# Patient Record
Sex: FEMALE | URBAN - METROPOLITAN AREA
[De-identification: names, ages, dates, MRNs, and addresses within clinical notes are randomized per-mention and may not be internally consistent; named-entity substitution may affect disease eponyms.]

---

## 2018-07-27 ENCOUNTER — INPATIENT (INPATIENT)
Facility: HOSPITAL | Age: 73
LOS: 2 days | Discharge: NURSING, REHAB OR LONG-TERM CARE | DRG: 884 | End: 2018-07-30
Attending: FAMILY MEDICINE | Admitting: FAMILY MEDICINE
Payer: MEDICARE

## 2018-07-27 VITALS
TEMPERATURE: 98 F | SYSTOLIC BLOOD PRESSURE: 138 MMHG | HEIGHT: 66 IN | RESPIRATION RATE: 16 BRPM | OXYGEN SATURATION: 97 % | DIASTOLIC BLOOD PRESSURE: 82 MMHG | HEART RATE: 88 BPM | WEIGHT: 119.93 LBS

## 2018-07-27 DIAGNOSIS — Z65.9 PROBLEM RELATED TO UNSPECIFIED PSYCHOSOCIAL CIRCUMSTANCES: ICD-10-CM

## 2018-07-27 LAB
ALBUMIN SERPL ELPH-MCNC: 3.4 G/DL — SIGNIFICANT CHANGE UP (ref 3.3–5)
ALP SERPL-CCNC: 80 U/L — SIGNIFICANT CHANGE UP (ref 30–120)
ALT FLD-CCNC: 30 U/L DA — SIGNIFICANT CHANGE UP (ref 10–60)
ANION GAP SERPL CALC-SCNC: 6 MMOL/L — SIGNIFICANT CHANGE UP (ref 5–17)
APPEARANCE UR: ABNORMAL
AST SERPL-CCNC: 31 U/L — SIGNIFICANT CHANGE UP (ref 10–40)
BASOPHILS # BLD AUTO: 0.03 K/UL — SIGNIFICANT CHANGE UP (ref 0–0.2)
BASOPHILS NFR BLD AUTO: 0.5 % — SIGNIFICANT CHANGE UP (ref 0–2)
BILIRUB SERPL-MCNC: 0.4 MG/DL — SIGNIFICANT CHANGE UP (ref 0.2–1.2)
BILIRUB UR-MCNC: NEGATIVE — SIGNIFICANT CHANGE UP
BUN SERPL-MCNC: 11 MG/DL — SIGNIFICANT CHANGE UP (ref 7–23)
CALCIUM SERPL-MCNC: 9.6 MG/DL — SIGNIFICANT CHANGE UP (ref 8.4–10.5)
CHLORIDE SERPL-SCNC: 105 MMOL/L — SIGNIFICANT CHANGE UP (ref 96–108)
CO2 SERPL-SCNC: 27 MMOL/L — SIGNIFICANT CHANGE UP (ref 22–31)
COLOR SPEC: YELLOW — SIGNIFICANT CHANGE UP
CREAT SERPL-MCNC: 0.77 MG/DL — SIGNIFICANT CHANGE UP (ref 0.5–1.3)
DIFF PNL FLD: ABNORMAL
EOSINOPHIL # BLD AUTO: 0.07 K/UL — SIGNIFICANT CHANGE UP (ref 0–0.5)
EOSINOPHIL NFR BLD AUTO: 1.2 % — SIGNIFICANT CHANGE UP (ref 0–6)
GLUCOSE SERPL-MCNC: 116 MG/DL — HIGH (ref 70–99)
GLUCOSE UR QL: NEGATIVE MG/DL — SIGNIFICANT CHANGE UP
HCT VFR BLD CALC: 41.6 % — SIGNIFICANT CHANGE UP (ref 34.5–45)
HGB BLD-MCNC: 13.3 G/DL — SIGNIFICANT CHANGE UP (ref 11.5–15.5)
IMM GRANULOCYTES NFR BLD AUTO: 0.2 % — SIGNIFICANT CHANGE UP (ref 0–1.5)
KETONES UR-MCNC: NEGATIVE — SIGNIFICANT CHANGE UP
LEUKOCYTE ESTERASE UR-ACNC: ABNORMAL
LYMPHOCYTES # BLD AUTO: 1.66 K/UL — SIGNIFICANT CHANGE UP (ref 1–3.3)
LYMPHOCYTES # BLD AUTO: 28.3 % — SIGNIFICANT CHANGE UP (ref 13–44)
MCHC RBC-ENTMCNC: 26.8 PG — LOW (ref 27–34)
MCHC RBC-ENTMCNC: 32 GM/DL — SIGNIFICANT CHANGE UP (ref 32–36)
MCV RBC AUTO: 83.9 FL — SIGNIFICANT CHANGE UP (ref 80–100)
MONOCYTES # BLD AUTO: 0.44 K/UL — SIGNIFICANT CHANGE UP (ref 0–0.9)
MONOCYTES NFR BLD AUTO: 7.5 % — SIGNIFICANT CHANGE UP (ref 2–14)
NEUTROPHILS # BLD AUTO: 3.66 K/UL — SIGNIFICANT CHANGE UP (ref 1.8–7.4)
NEUTROPHILS NFR BLD AUTO: 62.3 % — SIGNIFICANT CHANGE UP (ref 43–77)
NITRITE UR-MCNC: POSITIVE
PH UR: 8 — SIGNIFICANT CHANGE UP (ref 5–8)
PLATELET # BLD AUTO: 283 K/UL — SIGNIFICANT CHANGE UP (ref 150–400)
POTASSIUM SERPL-MCNC: 4.5 MMOL/L — SIGNIFICANT CHANGE UP (ref 3.5–5.3)
POTASSIUM SERPL-SCNC: 4.5 MMOL/L — SIGNIFICANT CHANGE UP (ref 3.5–5.3)
PROT SERPL-MCNC: 8.2 G/DL — SIGNIFICANT CHANGE UP (ref 6–8.3)
PROT UR-MCNC: NEGATIVE MG/DL — SIGNIFICANT CHANGE UP
RBC # BLD: 4.96 M/UL — SIGNIFICANT CHANGE UP (ref 3.8–5.2)
RBC # FLD: 13.6 % — SIGNIFICANT CHANGE UP (ref 10.3–14.5)
SODIUM SERPL-SCNC: 138 MMOL/L — SIGNIFICANT CHANGE UP (ref 135–145)
SP GR SPEC: 1.01 — SIGNIFICANT CHANGE UP (ref 1.01–1.02)
UROBILINOGEN FLD QL: NEGATIVE MG/DL — SIGNIFICANT CHANGE UP
WBC # BLD: 5.87 K/UL — SIGNIFICANT CHANGE UP (ref 3.8–10.5)
WBC # FLD AUTO: 5.87 K/UL — SIGNIFICANT CHANGE UP (ref 3.8–10.5)

## 2018-07-27 PROCEDURE — 93010 ELECTROCARDIOGRAM REPORT: CPT | Mod: 76

## 2018-07-27 PROCEDURE — 99222 1ST HOSP IP/OBS MODERATE 55: CPT | Mod: AI

## 2018-07-27 PROCEDURE — 99285 EMERGENCY DEPT VISIT HI MDM: CPT

## 2018-07-27 PROCEDURE — 71046 X-RAY EXAM CHEST 2 VIEWS: CPT | Mod: 26

## 2018-07-27 RX ORDER — DOCUSATE SODIUM 100 MG
100 CAPSULE ORAL THREE TIMES A DAY
Refills: 0 | Status: DISCONTINUED | OUTPATIENT
Start: 2018-07-27 | End: 2018-07-30

## 2018-07-27 RX ORDER — MEMANTINE HYDROCHLORIDE 10 MG/1
10 TABLET ORAL DAILY
Refills: 0 | Status: DISCONTINUED | OUTPATIENT
Start: 2018-07-27 | End: 2018-07-30

## 2018-07-27 RX ORDER — ONDANSETRON 8 MG/1
4 TABLET, FILM COATED ORAL EVERY 6 HOURS
Refills: 0 | Status: DISCONTINUED | OUTPATIENT
Start: 2018-07-27 | End: 2018-07-30

## 2018-07-27 RX ORDER — HEPARIN SODIUM 5000 [USP'U]/ML
5000 INJECTION INTRAVENOUS; SUBCUTANEOUS EVERY 12 HOURS
Refills: 0 | Status: DISCONTINUED | OUTPATIENT
Start: 2018-07-27 | End: 2018-07-30

## 2018-07-27 RX ORDER — METOPROLOL TARTRATE 50 MG
25 TABLET ORAL DAILY
Refills: 0 | Status: DISCONTINUED | OUTPATIENT
Start: 2018-07-27 | End: 2018-07-30

## 2018-07-27 RX ORDER — CEFTRIAXONE 500 MG/1
1 INJECTION, POWDER, FOR SOLUTION INTRAMUSCULAR; INTRAVENOUS ONCE
Refills: 0 | Status: COMPLETED | OUTPATIENT
Start: 2018-07-27 | End: 2018-07-27

## 2018-07-27 RX ORDER — CEFTRIAXONE 500 MG/1
1 INJECTION, POWDER, FOR SOLUTION INTRAMUSCULAR; INTRAVENOUS EVERY 24 HOURS
Refills: 0 | Status: DISCONTINUED | OUTPATIENT
Start: 2018-07-28 | End: 2018-07-30

## 2018-07-27 RX ORDER — ACETAMINOPHEN 500 MG
650 TABLET ORAL EVERY 6 HOURS
Refills: 0 | Status: DISCONTINUED | OUTPATIENT
Start: 2018-07-27 | End: 2018-07-30

## 2018-07-27 RX ORDER — SENNA PLUS 8.6 MG/1
2 TABLET ORAL AT BEDTIME
Refills: 0 | Status: DISCONTINUED | OUTPATIENT
Start: 2018-07-27 | End: 2018-07-30

## 2018-07-27 RX ADMIN — HEPARIN SODIUM 5000 UNIT(S): 5000 INJECTION INTRAVENOUS; SUBCUTANEOUS at 21:05

## 2018-07-27 RX ADMIN — CEFTRIAXONE 100 GRAM(S): 500 INJECTION, POWDER, FOR SOLUTION INTRAMUSCULAR; INTRAVENOUS at 15:51

## 2018-07-27 RX ADMIN — Medication 100 MILLIGRAM(S): at 21:05

## 2018-07-27 NOTE — H&P ADULT - GASTROINTESTINAL DETAILS
normal/soft/nontender/no distention/bowel sounds normal/no rebound tenderness/no guarding/no rigidity

## 2018-07-27 NOTE — ED PROVIDER NOTE - PROGRESS NOTE DETAILS
spoke with , Virgie who will see patient in ED patient resting comfortably. eating lunch. no complaints. Virgie  has been down to see patient. will call hospitalist for admission Tracy Joyner for Dr Shivam Loo:Pt here with niece who is unable to care for pt. Brought to ED today for placement. Pt is well appearing, ROS is otherwise negative. Spoke with hospitalist, Dr. Barton. Will speak with  for disposition options Dr. Barton will admit. patient and family informed of plan spoke with Dr. Barton regarding urine results. would like rocephin ordered

## 2018-07-27 NOTE — ED ADULT NURSE NOTE - OBJECTIVE STATEMENT
patient is from home, hx of dementia and need social placement, patient's care give passed away and unable to stay home by herself. patient is confused but calm at this time.

## 2018-07-27 NOTE — ED PROVIDER NOTE - OBJECTIVE STATEMENT
presents with adopted niece (sister's adopted daughter) for medical evaluation and placement. history of demential and Alzheimer's. patient's sister was caregiver who passed away in January. Patient's sister daughter was living with patient in her house taking care of her and "just randomly left" yesterday stating she couldn't handle it anymore. no one able to reach her. Neighbor found patient randomly walking around yesterday and called patient's adopted niece who came and got her. Patient's adopted niece unable to care for patient long tern due to having full time job, having 2 kids, and getting  in 3 weeks. patient at normal neuro baseline. no pain or complaints. patient unable to care for self. not oriented to time, person, or place. presents with adopted niece (sister's adopted daughter) for medical evaluation and placement. history of demential and Alzheimer's. patient's sister was caregiver who passed away in January. Patient's sister daughter was living with patient in her house taking care of her and "just randomly left" yesterday stating she couldn't handle it anymore. no one able to reach her. Neighbor found patient randomly walking around yesterday and called patient's adopted niece who came and got her. Patient's adopted niece unable to care for patient long tern due to having full time job, having 2 kids, and getting  in 3 weeks. patient at normal neuro baseline. no pain or complaints. patient unable to care for self. not oriented to time, person, or place.    PCP Dr. Guzman

## 2018-07-27 NOTE — H&P ADULT - RS GEN PE MLT RESP DETAILS PC
normal/airway patent/breath sounds equal/good air movement/respirations non-labored/clear to auscultation bilaterally/no chest wall tenderness

## 2018-07-27 NOTE — H&P ADULT - NEUROLOGICAL DETAILS
responds to pain/responds to verbal commands/sensation intact/deep reflexes intact/cranial nerves intact/superficial reflexes intact/normal strength

## 2018-07-27 NOTE — H&P ADULT - ATTENDING COMMENTS
IMPROVE VTE Individual Risk Assessment    RISK                                                          Points  [] Previous VTE                                           3  [] Thrombophilia                                        2  [] Lower limb paralysis                              2   [] Current Cancer                                       2   [] Immobilization > 24 hrs                        1  [] ICU/CCU stay > 24 hours                       1  [] Age > 60                                                   1    IMPROVE VTE Score:2  heparin for DVT

## 2018-07-27 NOTE — H&P ADULT - ASSESSMENT
Patient is 72 yo female with hx of dementia, and HTN presenting with     1.  Dementia.  Need social admission, and placement.  PT  and  consult  2.  UTI.  Continue with rocephin pending Urine culture  3.  HTN.  Continue with metoprolol, monitor BP    called PMD at [phone] to obtain further information, await a call bacl ( Dr. Womack)

## 2018-07-27 NOTE — ED PROVIDER NOTE - SECONDARY DIAGNOSIS.
Dementia without behavioral disturbance, unspecified dementia type Urinary tract infection without hematuria, site unspecified

## 2018-07-27 NOTE — ED PROVIDER NOTE - CARE PLAN
Principal Discharge DX:	Poor social situation  Secondary Diagnosis:	Dementia without behavioral disturbance, unspecified dementia type Principal Discharge DX:	Poor social situation  Secondary Diagnosis:	Dementia without behavioral disturbance, unspecified dementia type  Secondary Diagnosis:	Urinary tract infection without hematuria, site unspecified

## 2018-07-27 NOTE — ED PROVIDER NOTE - MEDICAL DECISION MAKING DETAILS
presents for social placement. history of dementia. unable to care for self. recent caregiver  in  and other caregiver left yesterday without notifying anyone. at normal neuro baseline per adopted niece. will order CBC, CMP. UA, CXR, EKG, and contact  for consult

## 2018-07-27 NOTE — ED PROVIDER NOTE - NEUROLOGICAL, MLM
alert. able to follow simple commands. not oriented to time, person, or place (normal neuro baseline per patient's adopted niece)

## 2018-07-27 NOTE — H&P ADULT - HISTORY OF PRESENT ILLNESS
Patient is 72 yo female with hx of dementia presenting to the hospital for social admission, and placement.  patient is current living with her sister adopted daughter which is unable to provide for her.  patient is confused at baseline, unable to obtain ROS, or History.

## 2018-07-28 LAB
ANION GAP SERPL CALC-SCNC: 7 MMOL/L — SIGNIFICANT CHANGE UP (ref 5–17)
BUN SERPL-MCNC: 8 MG/DL — SIGNIFICANT CHANGE UP (ref 7–23)
CALCIUM SERPL-MCNC: 9.4 MG/DL — SIGNIFICANT CHANGE UP (ref 8.4–10.5)
CHLORIDE SERPL-SCNC: 105 MMOL/L — SIGNIFICANT CHANGE UP (ref 96–108)
CO2 SERPL-SCNC: 31 MMOL/L — SIGNIFICANT CHANGE UP (ref 22–31)
CREAT SERPL-MCNC: 0.8 MG/DL — SIGNIFICANT CHANGE UP (ref 0.5–1.3)
GLUCOSE SERPL-MCNC: 99 MG/DL — SIGNIFICANT CHANGE UP (ref 70–99)
HCT VFR BLD CALC: 36.2 % — SIGNIFICANT CHANGE UP (ref 34.5–45)
HGB BLD-MCNC: 11.8 G/DL — SIGNIFICANT CHANGE UP (ref 11.5–15.5)
MCHC RBC-ENTMCNC: 27.2 PG — SIGNIFICANT CHANGE UP (ref 27–34)
MCHC RBC-ENTMCNC: 32.6 GM/DL — SIGNIFICANT CHANGE UP (ref 32–36)
MCV RBC AUTO: 83.4 FL — SIGNIFICANT CHANGE UP (ref 80–100)
NRBC # BLD: 0 /100 WBCS — SIGNIFICANT CHANGE UP (ref 0–0)
PLATELET # BLD AUTO: 295 K/UL — SIGNIFICANT CHANGE UP (ref 150–400)
POTASSIUM SERPL-MCNC: 3.7 MMOL/L — SIGNIFICANT CHANGE UP (ref 3.5–5.3)
POTASSIUM SERPL-SCNC: 3.7 MMOL/L — SIGNIFICANT CHANGE UP (ref 3.5–5.3)
RBC # BLD: 4.34 M/UL — SIGNIFICANT CHANGE UP (ref 3.8–5.2)
RBC # FLD: 13.4 % — SIGNIFICANT CHANGE UP (ref 10.3–14.5)
SODIUM SERPL-SCNC: 143 MMOL/L — SIGNIFICANT CHANGE UP (ref 135–145)
WBC # BLD: 4.95 K/UL — SIGNIFICANT CHANGE UP (ref 3.8–10.5)
WBC # FLD AUTO: 4.95 K/UL — SIGNIFICANT CHANGE UP (ref 3.8–10.5)

## 2018-07-28 PROCEDURE — 99232 SBSQ HOSP IP/OBS MODERATE 35: CPT

## 2018-07-28 RX ADMIN — HEPARIN SODIUM 5000 UNIT(S): 5000 INJECTION INTRAVENOUS; SUBCUTANEOUS at 10:51

## 2018-07-28 RX ADMIN — MEMANTINE HYDROCHLORIDE 10 MILLIGRAM(S): 10 TABLET ORAL at 13:40

## 2018-07-28 RX ADMIN — CEFTRIAXONE 100 GRAM(S): 500 INJECTION, POWDER, FOR SOLUTION INTRAMUSCULAR; INTRAVENOUS at 17:36

## 2018-07-28 RX ADMIN — Medication 100 MILLIGRAM(S): at 13:43

## 2018-07-28 RX ADMIN — Medication 100 MILLIGRAM(S): at 22:06

## 2018-07-28 RX ADMIN — HEPARIN SODIUM 5000 UNIT(S): 5000 INJECTION INTRAVENOUS; SUBCUTANEOUS at 22:06

## 2018-07-28 RX ADMIN — Medication 25 MILLIGRAM(S): at 06:26

## 2018-07-28 RX ADMIN — Medication 100 MILLIGRAM(S): at 06:26

## 2018-07-28 NOTE — PHYSICAL THERAPY INITIAL EVALUATION ADULT - ADDITIONAL COMMENTS
Pt with dementia, stated she didn't ambulate with device and required assistance with showering and dressing however questionable historian. Pt not oriented to time, place or self (unable to state last name).

## 2018-07-29 RX ADMIN — CEFTRIAXONE 100 GRAM(S): 500 INJECTION, POWDER, FOR SOLUTION INTRAMUSCULAR; INTRAVENOUS at 17:11

## 2018-07-29 RX ADMIN — Medication 100 MILLIGRAM(S): at 22:03

## 2018-07-29 RX ADMIN — MEMANTINE HYDROCHLORIDE 10 MILLIGRAM(S): 10 TABLET ORAL at 11:14

## 2018-07-29 RX ADMIN — HEPARIN SODIUM 5000 UNIT(S): 5000 INJECTION INTRAVENOUS; SUBCUTANEOUS at 22:02

## 2018-07-29 RX ADMIN — Medication 100 MILLIGRAM(S): at 05:28

## 2018-07-29 RX ADMIN — Medication 100 MILLIGRAM(S): at 13:42

## 2018-07-29 RX ADMIN — Medication 25 MILLIGRAM(S): at 05:28

## 2018-07-29 RX ADMIN — HEPARIN SODIUM 5000 UNIT(S): 5000 INJECTION INTRAVENOUS; SUBCUTANEOUS at 11:14

## 2018-07-30 ENCOUNTER — TRANSCRIPTION ENCOUNTER (OUTPATIENT)
Age: 73
End: 2018-07-30

## 2018-07-30 VITALS
TEMPERATURE: 98 F | HEART RATE: 81 BPM | DIASTOLIC BLOOD PRESSURE: 73 MMHG | RESPIRATION RATE: 16 BRPM | OXYGEN SATURATION: 96 % | SYSTOLIC BLOOD PRESSURE: 112 MMHG

## 2018-07-30 LAB
ANION GAP SERPL CALC-SCNC: 10 MMOL/L — SIGNIFICANT CHANGE UP (ref 5–17)
BUN SERPL-MCNC: 11 MG/DL — SIGNIFICANT CHANGE UP (ref 7–23)
CALCIUM SERPL-MCNC: 9.3 MG/DL — SIGNIFICANT CHANGE UP (ref 8.4–10.5)
CHLORIDE SERPL-SCNC: 104 MMOL/L — SIGNIFICANT CHANGE UP (ref 96–108)
CO2 SERPL-SCNC: 29 MMOL/L — SIGNIFICANT CHANGE UP (ref 22–31)
CREAT SERPL-MCNC: 0.85 MG/DL — SIGNIFICANT CHANGE UP (ref 0.5–1.3)
GLUCOSE SERPL-MCNC: 99 MG/DL — SIGNIFICANT CHANGE UP (ref 70–99)
HCT VFR BLD CALC: 38.2 % — SIGNIFICANT CHANGE UP (ref 34.5–45)
HGB BLD-MCNC: 12.2 G/DL — SIGNIFICANT CHANGE UP (ref 11.5–15.5)
MCHC RBC-ENTMCNC: 26.7 PG — LOW (ref 27–34)
MCHC RBC-ENTMCNC: 31.9 GM/DL — LOW (ref 32–36)
MCV RBC AUTO: 83.6 FL — SIGNIFICANT CHANGE UP (ref 80–100)
NRBC # BLD: 0 /100 WBCS — SIGNIFICANT CHANGE UP (ref 0–0)
PLATELET # BLD AUTO: 288 K/UL — SIGNIFICANT CHANGE UP (ref 150–400)
POTASSIUM SERPL-MCNC: 3.9 MMOL/L — SIGNIFICANT CHANGE UP (ref 3.5–5.3)
POTASSIUM SERPL-SCNC: 3.9 MMOL/L — SIGNIFICANT CHANGE UP (ref 3.5–5.3)
RBC # BLD: 4.57 M/UL — SIGNIFICANT CHANGE UP (ref 3.8–5.2)
RBC # FLD: 13.6 % — SIGNIFICANT CHANGE UP (ref 10.3–14.5)
SODIUM SERPL-SCNC: 143 MMOL/L — SIGNIFICANT CHANGE UP (ref 135–145)
WBC # BLD: 5.81 K/UL — SIGNIFICANT CHANGE UP (ref 3.8–10.5)
WBC # FLD AUTO: 5.81 K/UL — SIGNIFICANT CHANGE UP (ref 3.8–10.5)

## 2018-07-30 PROCEDURE — 93005 ELECTROCARDIOGRAM TRACING: CPT

## 2018-07-30 PROCEDURE — 99285 EMERGENCY DEPT VISIT HI MDM: CPT | Mod: 25

## 2018-07-30 PROCEDURE — 80048 BASIC METABOLIC PNL TOTAL CA: CPT

## 2018-07-30 PROCEDURE — 97161 PT EVAL LOW COMPLEX 20 MIN: CPT

## 2018-07-30 PROCEDURE — 97116 GAIT TRAINING THERAPY: CPT

## 2018-07-30 PROCEDURE — 85027 COMPLETE CBC AUTOMATED: CPT

## 2018-07-30 PROCEDURE — 99239 HOSP IP/OBS DSCHRG MGMT >30: CPT

## 2018-07-30 PROCEDURE — 71046 X-RAY EXAM CHEST 2 VIEWS: CPT

## 2018-07-30 PROCEDURE — 80053 COMPREHEN METABOLIC PANEL: CPT

## 2018-07-30 PROCEDURE — 97110 THERAPEUTIC EXERCISES: CPT

## 2018-07-30 PROCEDURE — 36415 COLL VENOUS BLD VENIPUNCTURE: CPT

## 2018-07-30 PROCEDURE — 81001 URINALYSIS AUTO W/SCOPE: CPT

## 2018-07-30 RX ORDER — CEFUROXIME AXETIL 250 MG
1 TABLET ORAL
Qty: 10 | Refills: 0
Start: 2018-07-30 | End: 2018-08-03

## 2018-07-30 RX ADMIN — Medication 100 MILLIGRAM(S): at 13:38

## 2018-07-30 RX ADMIN — CEFTRIAXONE 100 GRAM(S): 500 INJECTION, POWDER, FOR SOLUTION INTRAMUSCULAR; INTRAVENOUS at 15:58

## 2018-07-30 RX ADMIN — MEMANTINE HYDROCHLORIDE 10 MILLIGRAM(S): 10 TABLET ORAL at 11:43

## 2018-07-30 RX ADMIN — Medication 100 MILLIGRAM(S): at 06:11

## 2018-07-30 RX ADMIN — HEPARIN SODIUM 5000 UNIT(S): 5000 INJECTION INTRAVENOUS; SUBCUTANEOUS at 11:43

## 2018-07-30 NOTE — DISCHARGE NOTE ADULT - SECONDARY DIAGNOSIS.
HTN (hypertension) Urinary tract infection without hematuria, site unspecified Dementia without behavioral disturbance, unspecified dementia type

## 2018-07-30 NOTE — DISCHARGE NOTE ADULT - HOSPITAL COURSE
Patient is 72 yo female with hx of dementia, and HTN presenting with     1.  Dementia.  admitted for social admission placement.  PT  and  consult  d/c planning  contniue on namenda   2.  UTI.  Continue with  ceftin   3.  HTN.  controlled.   Continue with metoprolol,   monitor BP closely.   4. Dementia - continue with memantine .     d/c planning

## 2018-07-30 NOTE — DISCHARGE NOTE ADULT - PATIENT PORTAL LINK FT
You can access the [email or web address], offered by [facility], by registering with the following website: [email or web address]

## 2018-07-30 NOTE — DISCHARGE NOTE ADULT - CARE PLAN
Principal Discharge DX:	Poor social situation  Goal:	placement  Assessment and plan of treatment:	d/c planning  Secondary Diagnosis:	Dementia without behavioral disturbance, unspecified dementia type  Goal:	namedine  Secondary Diagnosis:	HTN (hypertension)  Secondary Diagnosis:	Urinary tract infection without hematuria, site unspecified  Goal:	complete ceftin for 5 days

## 2018-07-30 NOTE — DISCHARGE NOTE ADULT - MEDICATION SUMMARY - MEDICATIONS TO TAKE
I will START or STAY ON the medications listed below when I get home from the hospital:    Toprol-XL 25 mg oral tablet, extended release  -- 1 tab(s) by mouth once a day  -- Indication: For HTN (hypertension)    cefuroxime 250 mg oral tablet  -- 1 tab(s) by mouth every 12 hours   -- Finish all this medication unless otherwise directed by prescriber.  Medication should be taken with plenty of water.  Take with food or milk.    -- Indication: For Uti    memantine 10 mg oral tablet  -- 10 milligram(s) by mouth once a day  -- Indication: For Dementia

## 2018-07-30 NOTE — PROGRESS NOTE ADULT - ASSESSMENT
Patient is 72 yo female with hx of dementia, and HTN presenting with     1.  Dementia.  Need social admission, and placement.  PT  and  consult  2.  UTI.  Continue with rocephin pending Urine culture  3.  HTN.  Continue with metoprolol, monitor BP    called PMD at [phone] to obtain further information, await a call bacl ( Dr. Womack)
Patient is 74 yo female with hx of dementia, and HTN presenting with     1.  Dementia.  Need social admission, and placement.  PT  and  consult  2.  UTI.  Continue with  ceftriaxone   pending Urine culture  3.  HTN.  controlled.   Continue with metoprolol,   monitor BP closely.   4. Dementia - continue with memantine .
Patient is 74 yo female with hx of dementia, and HTN presenting with     1.  Dementia.  Need social admission, and placement.  PT  and  consult  2.  UTI.  Continue with  ceftriaxone   pending Urine culture  3.  HTN.  controlled.   Continue with metoprolol,   monitor BP closely.   4. Dementia - continue with memantine .

## 2018-07-30 NOTE — PROGRESS NOTE ADULT - SUBJECTIVE AND OBJECTIVE BOX
Patient is a 73y old  Female who presents with a chief complaint of Social admission (2018 16:02)        HPI:  Patient is 74 yo female with hx of dementia presenting to the hospital for social admission, and placement.  patient is current living with her sister adopted daughter which is unable to provide for her.  patient is confused at baseline, unable to obtain ROS, or History. (2018 16:02)      SUBJECTIVE & OBJECTIVE: Pt seen and examined at bedside, comfortable, pleasently confused     PHYSICAL EXAM:  T(C): 36.7 (18 @ 09:33), Max: 36.9 (18 @ 00:00)  HR: 69 (18 @ 09:33) (69 - 93)  BP: 133/84 (18 @ 09:33) (128/77 - 156/87)  RR: 16 (18 @ 09:33) (15 - 16)  SpO2: 97% (18 @ 09:33) (97% - 100%)  Wt(kg): -- Height (cm): 167.64 ( @ 11:00)  Weight (kg): 54.4 ( @ 11:00)  BMI (kg/m2): 19.4 ( @ 11:00)  BSA (m2): 1.61 ( @ 11:00)  GENERAL: pleasently confused   HEAD:  Atraumatic, Normocephalic  EYES: EOMI, PERRLA, conjunctiva and sclera clear  ENMT: Moist mucous membranes  NECK: Supple, No JVD  NERVOUS SYSTEM:  Alert & Oriented X1  CHEST/LUNG: decrease air entry at the bases   HEART: Regular rate and rhythm; No murmurs, rubs, or gallops  ABDOMEN: Soft, Nontender, Nondistended; Bowel sounds present  EXTREMITIES:  2+ Peripheral Pulses, No clubbing, cyanosis, or edema        MEDICATIONS  (STANDING):  cefTRIAXone   IVPB 1 Gram(s) IV Intermittent every 24 hours  docusate sodium 100 milliGRAM(s) Oral three times a day  heparin  Injectable 5000 Unit(s) SubCutaneous every 12 hours  memantine 10 milliGRAM(s) Oral daily  metoprolol succinate ER 25 milliGRAM(s) Oral daily    MEDICATIONS  (PRN):  acetaminophen   Tablet. 650 milliGRAM(s) Oral every 6 hours PRN Mild Pain (1 - 3)  ondansetron Injectable 4 milliGRAM(s) IV Push every 6 hours PRN Nausea  senna 2 Tablet(s) Oral at bedtime PRN Constipation      LABS:                        11.8   4.95  )-----------( 295      ( 2018 08:07 )             36.2     07-    143  |  105  |  8   ----------------------------<  99  3.7   |  31  |  0.80    Ca    9.4      2018 08:07    TPro  8.2  /  Alb  3.4  /  TBili  0.4  /  DBili  x   /  AST  31  /  ALT  30  /  AlkPhos  80        Urinalysis Basic - ( 2018 14:27 )    Color: Yellow / Appearance: Slightly Turbid / S.015 / pH: x  Gluc: x / Ketone: Negative  / Bili: Negative / Urobili: Negative mg/dL   Blood: x / Protein: Negative mg/dL / Nitrite: Positive   Leuk Esterase: Trace / RBC: 0-2 /HPF / WBC 0-2   Sq Epi: x / Non Sq Epi: Occasional / Bacteria: Moderate        CAPILLARY BLOOD GLUCOSE          CAPILLARY BLOOD GLUCOSE        CAPILLARY BLOOD GLUCOSE                RECENT CULTURES:      RADIOLOGY & ADDITIONAL TESTS:                        DVT/GI ppx  Discussed with pt @ bedside
Patient is a 73y old  Female who presents with a chief complaint of Social admission (27 Jul 2018 16:02)        HPI:  Patient is 72 yo female with hx of dementia presenting to the hospital for social admission, and placement.  patient is current living with her sister adopted daughter which is unable to provide for her.  patient is confused at baseline, unable to obtain ROS, or History. (27 Jul 2018 16:02)      SUBJECTIVE & OBJECTIVE: Pt seen and examined at bedside, comfortable      PHYSICAL EXAM:  T(C): 36.4 (07-30-18 @ 09:40), Max: 37.2 (07-29-18 @ 21:51)  HR: 82 (07-30-18 @ 09:40) (73 - 88)  BP: 144/80 (07-30-18 @ 09:40) (110/74 - 144/80)  RR: 16 (07-30-18 @ 09:40) (15 - 17)  SpO2: 97% (07-30-18 @ 09:40) (96% - 100%)  Wt(kg): --   GENERAL: NAD, well-groomed, well-developed  HEAD:  Atraumatic, Normocephalic  EYES: EOMI, PERRLA, conjunctiva and sclera clear  ENMT: Moist mucous membranes  NECK: Supple, No JVD  NERVOUS SYSTEM:  Alert & Oriented X3, Motor Strength 5/5 B/L upper and lower extremities; DTRs 2+ intact and symmetric  CHEST/LUNG: Clear to auscultation bilaterally; No rales, rhonchi, wheezing, or rubs  HEART: Regular rate and rhythm; No murmurs, rubs, or gallops  ABDOMEN: Soft, Nontender, Nondistended; Bowel sounds present  EXTREMITIES:  2+ Peripheral Pulses, No clubbing, cyanosis, or edema        MEDICATIONS  (STANDING):  cefTRIAXone   IVPB 1 Gram(s) IV Intermittent every 24 hours  docusate sodium 100 milliGRAM(s) Oral three times a day  heparin  Injectable 5000 Unit(s) SubCutaneous every 12 hours  memantine 10 milliGRAM(s) Oral daily  metoprolol succinate ER 25 milliGRAM(s) Oral daily    MEDICATIONS  (PRN):  acetaminophen   Tablet. 650 milliGRAM(s) Oral every 6 hours PRN Mild Pain (1 - 3)  ondansetron Injectable 4 milliGRAM(s) IV Push every 6 hours PRN Nausea  senna 2 Tablet(s) Oral at bedtime PRN Constipation      LABS:                        12.2   5.81  )-----------( 288      ( 30 Jul 2018 07:36 )             38.2     07-30    143  |  104  |  11  ----------------------------<  99  3.9   |  29  |  0.85    Ca    9.3      30 Jul 2018 07:36            CAPILLARY BLOOD GLUCOSE          CAPILLARY BLOOD GLUCOSE        CAPILLARY BLOOD GLUCOSE                RECENT CULTURES:      RADIOLOGY & ADDITIONAL TESTS:                        DVT/GI ppx  Discussed with pt @ bedside
Patient is a 73y old  Female who presents with a chief complaint of  need for placement.       INTERVAL HPI/OVERNIGHT EVENTS: no acute overnight events .     MEDICATIONS  (STANDING):  cefTRIAXone   IVPB 1 Gram(s) IV Intermittent every 24 hours  docusate sodium 100 milliGRAM(s) Oral three times a day  heparin  Injectable 5000 Unit(s) SubCutaneous every 12 hours  memantine 10 milliGRAM(s) Oral daily  metoprolol succinate ER 25 milliGRAM(s) Oral daily    MEDICATIONS  (PRN):  acetaminophen   Tablet. 650 milliGRAM(s) Oral every 6 hours PRN Mild Pain (1 - 3)  ondansetron Injectable 4 milliGRAM(s) IV Push every 6 hours PRN Nausea  senna 2 Tablet(s) Oral at bedtime PRN Constipation      Allergies    No Known Allergies    Intolerances        REVIEW OF SYSTEMS:  CONSTITUTIONAL: No fever or chills  HEENT:  No headache, no sore throat  RESPIRATORY: No cough, wheezing, or shortness of breath  CARDIOVASCULAR: No chest pain, palpitations, or leg swelling  GASTROINTESTINAL: No nausea, vomiting, or diarrhea  GENITOURINARY: No dysuria, frequency, or hematuria  NEUROLOGICAL: no focal weakness or dizziness  SKIN:  No rashes or lesions   MUSCULOSKELETAL: no myalgias   PSYCHIATRIC: No depression or anxiety  ROS Unable to obtain due to - [ ] Dementia  [ ] Lethargy  REST OF REVIEW Of SYSTEM - [ ] Normal     Vital Signs Last 24 Hrs  T(C): 36.6 (2018 10:21), Max: 37.2 (2018 17:12)  T(F): 97.9 (2018 10:21), Max: 98.9 (2018 17:12)  HR: 74 (2018 10:21) (74 - 84)  BP: 117/76 (2018 10:21) (115/78 - 153/84)  BP(mean): --  RR: 16 (2018 10:21) (16 - 18)  SpO2: 97% (2018 10:21) (95% - 100%)    PHYSICAL EXAM:  GENERAL: NAD  HEENT:  EOMI, mmm  CHEST/LUNG:  CTA b/l, no rales, wheezes, or rhonchi  HEART:  RRR, S1, S2, []murmur  ABDOMEN:  BS+, soft, NT, ND  EXTREMITIES: no edema or calf tenderness    NERVOUS SYSTEM: AA&O     DIET:     Cultures:     LABS:    CBC Full  -  ( 2018 08:07 )  WBC Count : 4.95 K/uL  Hemoglobin : 11.8 g/dL  Hematocrit : 36.2 %  Platelet Count - Automated : 295 K/uL  Mean Cell Volume : 83.4 fl  Mean Cell Hemoglobin : 27.2 pg  Mean Cell Hemoglobin Concentration : 32.6 gm/dL  Auto Neutrophil # : x  Auto Lymphocyte # : x  Auto Monocyte # : x  Auto Eosinophil # : x  Auto Basophil # : x  Auto Neutrophil % : x  Auto Lymphocyte % : x  Auto Monocyte % : x  Auto Eosinophil % : x  Auto Basophil % : x      Ca    9.4        2018 08:07        Urinalysis Basic - ( 2018 14:27 )    Color: Yellow / Appearance: Slightly Turbid / S.015 / pH: x  Gluc: x / Ketone: Negative  / Bili: Negative / Urobili: Negative mg/dL   Blood: x / Protein: Negative mg/dL / Nitrite: Positive   Leuk Esterase: Trace / RBC: 0-2 /HPF / WBC 0-2   Sq Epi: x / Non Sq Epi: Occasional / Bacteria: Moderate      CAPILLARY BLOOD GLUCOSE              RADIOLOGY & ADDITIONAL TESTS:    Personally reviewed.     Consultant(s) Notes Reviewed:  [x] YES  [ ] NO    Care Discussed with [ ] Consultants  [x] Patient  [ ] Family  [x]      [ ] Other; RN  DVT ppx  Advanced directive

## 2019-02-13 NOTE — PHYSICAL THERAPY INITIAL EVALUATION ADULT - IMPAIRED TRANSFERS: SIT/STAND, REHAB EVAL
cognition/impaired coordination/impaired motor control

## 2019-04-19 NOTE — H&P ADULT - [facility] PE MLT D E PC
[facility] MED -BONE SCAN in progress  
 pt. Injected for Bone scan Please hydrate x 3hrs. Pt can resume diet Will scan @ 3:00pm today regular rate and rhythm/normal PMI

## 2022-06-24 NOTE — PATIENT PROFILE ADULT. - PRESSURE ULCER(S)
Pt is schedule for a Colonoscopy/EGD with Dr Catarina Davidson at [facility] on 8/10/2022. Time TBD. Covid Test is scheduled for 8/8/2022 at [place] at [facility]. Pt is aware of date,place,and time for the Covid Test.  Instructions given to patient for Colonoscopy/EGD. Patient educated with following instructions. Prep instructions were given verbally along with written instructions to take home. 7 Days prior to procedure medication, supplement and food restrictions including no foods with seeds, nuts, popcorn, kiwi or any other foods containing nuts and seeds. Day before procedure clear liquid dietary restrictions and bowel prep instructions. Day of procedure dietary restrictions and bowel prep instructions. Diabetic medication administration discussed. Anticoagulant medication administration discussed. Requirement to have a responsible adult available on the day of procedure to safely drive patient home. Patient verbalized understanding my instructions agreeing with them. All questions answered.
no

## 2024-02-29 RX ORDER — METOPROLOL TARTRATE 50 MG
1 TABLET ORAL
Qty: 0 | Refills: 0 | DISCHARGE

## 2024-02-29 RX ORDER — MEMANTINE HYDROCHLORIDE 10 MG/1
10 TABLET ORAL
Qty: 0 | Refills: 0 | DISCHARGE

## 2025-03-04 NOTE — ED PROVIDER NOTE - DR. NAME
EMERGENCY DEPARTMENT ENCOUNTER      NAME: Joan Estevez  AGE: 74 year old female  YOB: 1951  MRN: [id number]  EVALUATION DATE & TIME: 3/4/2025  4:48 AM    PCP: Heaven Mccoy    ED PROVIDER: Daniel Stoddard MD      Chief Complaint   Patient presents with    Dizziness    Headache         FINAL IMPRESSION:  1. Vertigo          ED COURSE & MEDICAL DECISION MAKING:    Pertinent Labs & Imaging studies reviewed. (See chart for details)  74 year old female presents to the Emergency Department for evaluation of headache, vertigo    ED Course as of 03/04/25 0716   Tue Mar 04, 2025   0515 Patient is a 74-year-old female who is anticoagulated on warfarin due to history of atrial fibrillation status post ablation, and presents to the emergency department with constant vertigo, headache that began approximately 12 hours prior to arrival.  Symptoms have improved a little after taking Tylenol and sleeping part of the night, but still persistent.  She denies any visual changes other than vertigo.  Denies numbness/weakness on one side of her body.  Recently she did hit her head on a tree branch, but not hard enough that caused loss of consciousness or made her feel like she should be evaluated.  She also recently had a reduction in her warfarin dose.  No recent changes in hearing or tinnitus.  Her neuroexam is unremarkable.  She has horizontal beating nystagmus.  Given her age, persistent symptoms, cannot exclude central cause of vertigo without further imaging.  Will start with CT head without contrast to evaluate for bleeding given her recent head trauma and being on warfarin.  Then will do MRI to evaluate for ischemic stroke.  In the meantime, we will check her INR and provide meclizine.   0536 INR is within goal, at 2.76.  No leukocytosis or anemia.   0607 CT head negative for acute intracranial pathology, specifically no bleeding.     4:57 AM I met with the patient, obtained history, performed an  initial exam, and discussed options and plan for diagnostics and treatment here in the ED.     Patient signed out to Dr. Hood with the following to do:  - f/up MRI    Medical Decision Making  Obtained supplemental history:Supplemental history obtained?: Documented in chart  Reviewed external records: External records reviewed?: Documented in chart  Care impacted by chronic illness:Documented in Chart and Anticoagulated State  Care significantly affected by social determinants of health:Access to Medical Care  Did you consider but not order tests?: Work up considered but not performed and documented in chart, if applicable  Did you interpret images independently?: Independent interpretation of ECG and images noted in documentation, when applicable.  Consultation discussion with other provider:Did you involve another provider (consultant, , pharmacy, etc.)?: I discussed the care with another health care provider, see documentation for details.  Admission considered. Patient was signed out to the oncoming physician, disposition pending.  Not Applicable      At the conclusion of the encounter I discussed the results of all of the tests and the disposition. The questions were answered. The patient or family acknowledged understanding and was agreeable with the care plan.     0 minutes of critical care time     MEDICATIONS GIVEN IN THE EMERGENCY:  Medications   meclizine (ANTIVERT) tablet 25 mg (25 mg Oral $Given 3/4/25 0519)       NEW PRESCRIPTIONS STARTED AT TODAY'S ER VISIT  New Prescriptions    MECLIZINE (ANTIVERT) 25 MG TABLET    Take 1 tablet (25 mg) by mouth 3 times daily as needed for dizziness.          =================================================================    HPI    Patient information was obtained from: Patient    Use of : N/A       Joan Estevez is a 74 year old female with a pertinent history of anticoagulated on Warfarin who presents to this ED via walk in for evaluation of  "dizziness.     The patient reports here with room-spinning dizziness that onset about twelve hours ago.  She reports gradual onset starting about fifteen hours ago, and she states it started with her just feeling \"off\". She notes that lights make her dizziness worse.  She also reports a headache that has waxed and wane and onset with the dizziness. She did try to go to bed last night and the laying in bed helped her dizziness but it has persisted through the night.  She is on Warfarin and notes her dose was recently reduced. She notes being on 10 mg previously but now thinks she is supposed to be on 5 mg.  She noted previously she had a-fib nut now has not for the last seven years since having an ablation.  She notes taking Tylenol at home.  She also notes hitting her head on a tree a few days ago.  No loss of vision, focal weakness or numbness, recent ear infections, tinnitus, or other complaints at this time.    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       PAST SURGICAL HISTORY:  Past Surgical History:   Procedure Laterality Date    CARDIAC SURGERY      cardioversion    COLONOSCOPY      GYN SURGERY      hyster abdomen    HYSTERECTOMY      OOPHORECTOMY      ORTHOPEDIC SURGERY      arthroscopy left    ORTHOPEDIC SURGERY      right foot neuroma         CURRENT MEDICATIONS:    meclizine (ANTIVERT) 25 MG tablet  acetaminophen (TYLENOL) 500 MG tablet  albuterol (PROAIR HFA/PROVENTIL HFA/VENTOLIN HFA) 108 (90 Base) MCG/ACT inhaler  diphenhydrAMINE (BENADRYL) 25 MG tablet  doxycycline hyclate (VIBRAMYCIN) 100 MG capsule  ibuprofen (ADVIL/MOTRIN) 200 MG tablet  ipratropium - albuterol 0.5 mg/2.5 mg/3 mL (DUONEB) 0.5-2.5 (3) MG/3ML neb solution  multivitamin, therapeutic with minerals (MULTI-VITAMIN) TABS  NEBUSAL 3 % " neb solution  omeprazole (PRILOSEC OTC) 20 MG EC tablet  ondansetron (ZOFRAN ODT) 4 MG ODT tab  Sodium Chloride 3.5 % NEBU  warfarin ANTICOAGULANT (COUMADIN) 5 MG tablet        ALLERGIES:  Allergies   Allergen Reactions    Levofloxacin Rash    Sulfa Antibiotics Rash       FAMILY HISTORY:  Family History   Problem Relation Age of Onset    Cancer Mother     Breast Cancer Paternal Aunt         early 50    Hereditary Breast and Ovarian Cancer Syndrome No family hx of     Colon Cancer No family hx of     Endometrial Cancer No family hx of     Ovarian Cancer No family hx of        SOCIAL HISTORY:   Social History     Socioeconomic History    Marital status:    Tobacco Use    Smoking status: Never     Passive exposure: Past (Both parents were smokers; did eventually quit)    Smokeless tobacco: Never   Vaping Use    Vaping status: Never Used   Substance and Sexual Activity    Alcohol use: No    Drug use: No     Social Drivers of Health     Financial Resource Strain: Low Risk  (3/18/2022)    Received from [facility]    Overall Financial Resource Strain (CARDIA)     Difficulty of Paying Living Expenses: Not hard at all   Food Insecurity: No Food Insecurity (3/18/2022)    Received from [facility]    Hunger Vital Sign     Worried About Running Out of Food in the Last Year: Never true     Ran Out of Food in the Last Year: Never true   Transportation Needs: No Transportation Needs (3/18/2022)    Received from [facility]    PRAPARE - Transportation     Lack of Transportation (Medical): No     Lack of Transportation (Non-Medical): No   Physical Activity: Sufficiently Active (3/18/2022)    Received from [facility]    Exercise Vital Sign     Days of Exercise per Week: 7 days     Minutes of Exercise per Session: 150+ min   Stress: No Stress Concern Present (3/18/2022)    Received from [facility]    [employer] - Occupational Stress Questionnaire     Feeling of Stress : Not at all   Social  Connections: Moderately Integrated (3/18/2022)    Received from [facility]    Social Connection and Isolation Panel [NHANES]     Frequency of Communication with Friends and Family: More than three times a week     Frequency of Social Gatherings with Friends and Family: More than three times a week     Attends [employer] Services: More than 4 times per year     Active Member of Clubs or Organizations: Yes     Attends Club or Organization Meetings: More than 4 times per year     Marital Status:    Interpersonal Safety: Not At Risk (3/18/2022)    Received from [facility]    Humiliation, Afraid, Rape, and Kick questionnaire     Fear of Current or Ex-Partner: No     Emotionally Abused: No     Physically Abused: No     Sexually Abused: No   Housing Stability: Low Risk  (3/18/2022)    Received from [facility]    Housing Stability Vital Sign     Unable to Pay for Housing in the Last Year: No     Number of Places Lived in the Last Year: 1     In the last 12 months, was there a time when you did not have a steady place to sleep or slept in a shelter (including now)?: No       VITALS:  /59 (BP Location: Left arm, Patient Position: Semi-Lepe's)   Pulse 68   Temp 98.3  F (36.8  C) (Oral)   Resp 18   Wt 63.5 kg (140 lb)   SpO2 97%   BMI 23.30 kg/m      PHYSICAL EXAM    Physical Exam  Vitals and nursing note reviewed.   Constitutional:       General: She is not in acute distress.     Appearance: Normal appearance. She is normal weight. She is not ill-appearing.   HENT:      Head: Normocephalic and atraumatic.      Nose: Nose normal.      Mouth/Throat:      Mouth: Mucous membranes are moist.      Pharynx: Oropharynx is clear.   Eyes:      Extraocular Movements: Extraocular movements intact.      Conjunctiva/sclera: Conjunctivae normal.      Pupils: Pupils are equal, round, and reactive to light.      Comments: Horizontal nystagmus   Cardiovascular:      Rate and Rhythm: Normal rate and regular rhythm.       Pulses: Normal pulses.   Pulmonary:      Effort: Pulmonary effort is normal.   Abdominal:      General: Abdomen is flat.   Musculoskeletal:         General: Normal range of motion.      Cervical back: Normal range of motion and neck supple. No rigidity.      Right lower leg: No edema.      Left lower leg: No edema.   Skin:     General: Skin is warm and dry.   Neurological:      General: No focal deficit present.      Mental Status: She is alert and oriented to person, place, and time. Mental status is at baseline.   Psychiatric:         Mood and Affect: Mood normal.         Behavior: Behavior normal.         Thought Content: Thought content normal.         Judgment: Judgment normal.            LAB:  All pertinent labs reviewed and interpreted.  Results for orders placed or performed during the hospital encounter of 03/04/25   CT Head w/o Contrast    Impression    IMPRESSION:  1.  No acute intracranial process.   Basic metabolic panel   Result Value Ref Range    Sodium 141 135 - 145 mmol/L    Potassium 3.8 3.4 - 5.3 mmol/L    Chloride 108 (H) 98 - 107 mmol/L    Carbon Dioxide (CO2) 28 22 - 29 mmol/L    Anion Gap 5 (L) 7 - 15 mmol/L    Urea Nitrogen 12.0 8.0 - 23.0 mg/dL    Creatinine 0.66 0.51 - 0.95 mg/dL    GFR Estimate >90 >60 mL/min/1.73m2    Calcium 9.3 8.8 - 10.4 mg/dL    Glucose 99 70 - 99 mg/dL   Result Value Ref Range    INR 2.76 (H) 0.85 - 1.15   CBC with platelets and differential   Result Value Ref Range    WBC Count 8.4 4.0 - 11.0 10e3/uL    RBC Count 4.72 3.80 - 5.20 10e6/uL    Hemoglobin 13.6 11.7 - 15.7 g/dL    Hematocrit 40.7 35.0 - 47.0 %    MCV 86 78 - 100 fL    MCH 28.8 26.5 - 33.0 pg    MCHC 33.4 31.5 - 36.5 g/dL    RDW 12.7 10.0 - 15.0 %    Platelet Count 229 150 - 450 10e3/uL    % Neutrophils 59 %    % Lymphocytes 34 %    % Monocytes 5 %    % Eosinophils 1 %    % Basophils 1 %    % Immature Granulocytes 0 %    NRBCs per 100 WBC 0 <1 /100    Absolute Neutrophils 4.9 1.6 - 8.3 10e3/uL    Absolute  Lymphocytes 2.8 0.8 - 5.3 10e3/uL    Absolute Monocytes 0.4 0.0 - 1.3 10e3/uL    Absolute Eosinophils 0.1 0.0 - 0.7 10e3/uL    Absolute Basophils 0.1 0.0 - 0.2 10e3/uL    Absolute Immature Granulocytes 0.0 <=0.4 10e3/uL    Absolute NRBCs 0.0 10e3/uL       RADIOLOGY:  Reviewed all pertinent imaging. Please see official radiology report.  CT Head w/o Contrast   Final Result   IMPRESSION:   1.  No acute intracranial process.      MR Brain w/o & w Contrast    (Results Pending)       PROCEDURES:   None      [facility] [employer] Documentation:   CMS Diagnoses:              I, Alex Cain, am serving as a scribe to document services personally performed by Daniel Stoddard MD based on my observation and the provider's statements to me. I, Daniel Stoddard MD, attest that Alex Barlow is acting in a scribe capacity, has observed my performance of the services and has documented them in accordance with my direction.    Daniel Stoddard MD  [facility] EMERGENCY DEPARTMENT  [address] [place] [id number] [phone]      Daniel Stoddard MD  03/04/25 0766     Shivam Cinda